# Patient Record
Sex: FEMALE | Race: WHITE | NOT HISPANIC OR LATINO | Employment: FULL TIME | URBAN - METROPOLITAN AREA
[De-identification: names, ages, dates, MRNs, and addresses within clinical notes are randomized per-mention and may not be internally consistent; named-entity substitution may affect disease eponyms.]

---

## 2021-06-14 ENCOUNTER — ANNUAL EXAM (OUTPATIENT)
Dept: OBGYN CLINIC | Facility: CLINIC | Age: 31
End: 2021-06-14
Payer: COMMERCIAL

## 2021-06-14 VITALS
DIASTOLIC BLOOD PRESSURE: 60 MMHG | SYSTOLIC BLOOD PRESSURE: 102 MMHG | BODY MASS INDEX: 25.07 KG/M2 | HEIGHT: 61 IN | WEIGHT: 132.8 LBS

## 2021-06-14 DIAGNOSIS — Z01.419 WOMEN'S ANNUAL ROUTINE GYNECOLOGICAL EXAMINATION: Primary | ICD-10-CM

## 2021-06-14 PROCEDURE — 99395 PREV VISIT EST AGE 18-39: CPT | Performed by: STUDENT IN AN ORGANIZED HEALTH CARE EDUCATION/TRAINING PROGRAM

## 2021-06-14 NOTE — PROGRESS NOTES
[id number]   [address] [facility] [address], [place], [place]    ASSESSMENT/PLAN: Germain Gauthier is a 28 y o  Lucero Douse who presents for annual gynecologic exam     Encounter for routine gynecologic examination  - Routine well woman exam completed today  - Cervical Cancer Screening: Current ASCCP Guidelines reviewed  Last Pap: 02/04/2020  History of abnormal: None  - HPV Vaccination status: Immunization series complete  - STI screening offered including HIV testing: offered, pt declined  - Contraceptive counseling discussed  Current contraception: IUD, satisfied:   - The following were reviewed in today's visit: breast self exam, STD testing, family planning choices, exercise and healthy diet    Additional problems addressed during this visit:  1  Women's annual routine gynecological examination        CC:  Annual Gynecologic Examination    HPI: Germain Gauthier is a 28 y o  Lucero Douse who presents for annual gynecologic examination  ROS: Negative except as noted in HPI    No LMP recorded  (Menstrual status: Birth Control)  Menstrual History  Period Pattern: (!) Irregular (In setting of Liletta IUD)  Menstrual Flow: Light  Dysmenorrhea: None    She  reports being sexually active and has had partner(s) who are Male   She reports using the following method of birth control/protection: I U D   Sexual History  Sexual Assault: No  Number of Partners in Last Year: 1 years  Sexually Transmitted Infection History: None  Multiple Sex Partners: No  Is Patient in a Monogamous Relationship?: Yes    The following portions of the patient's history were reviewed and updated as appropriate:   Past Medical History:   Diagnosis Date    Anxiety     Depression      Past Surgical History:   Procedure Laterality Date    WISDOM TOOTH EXTRACTION       Family History   Problem Relation Age of Onset    Hypertension Mother     Breast cancer Neg Hx     Uterine cancer Neg Hx     Ovarian cancer Neg Hx     Colon cancer Neg Hx Social History     Socioeconomic History    Marital status: /Civil Union     Spouse name: None    Number of children: None    Years of education: None    Highest education level: Bachelor's degree (e g , BA, AB, BS)   Occupational History    Occupation:    Tobacco Use    Smoking status: Never Smoker    Smokeless tobacco: Never Used   Vaping Use    Vaping Use: Never used   Substance and Sexual Activity    Alcohol use: Yes     Comment: Socially on weekends (1-4/week)     Drug use: Never     Comment: No     Sexual activity: Yes     Partners: Male     Birth control/protection: I U D  Comment: No new partner in last year ; Radha Gonsalez IUD inserted 3/2/21    Other Topics Concern    None   Social History Narrative    History of drug/alcohol abuse: Denies    Exercise: 5-7 times a week      Social Determinants of Health     Financial Resource Strain:     Difficulty of Paying Living Expenses:    Food Insecurity:     Worried About Running Out of Food in the Last Year:     [address] in the Last Year:    Transportation Needs:     Lack of Transportation (Medical):      Lack of Transportation (Non-Medical):    Physical Activity:     Days of Exercise per Week:     Minutes of Exercise per Session:    Stress:     Feeling of Stress :    Social Connections:     Frequency of Communication with Friends and Family:     Frequency of Social Gatherings with Friends and Family:     Attends Congregation Services:     Active Member of Clubs or Organizations:     Attends Club or Organization Meetings:     Marital Status:    Intimate Partner Violence:     Fear of Current or Ex-Partner:     Emotionally Abused:     Physically Abused:     Sexually Abused:      Outpatient Medications Marked as Taking for the 6/14/21 encounter (Annual Exam) with Twan Keller MD   Medication    Levonorgestrel (Heath Walters) 19 5 MCG/DAY IUD IUD    Prenatal MV-Min-Fe Fum-FA-DHA (PRENATAL 1 PO)     No Known Allergies Objective:  /60   Ht 5' 1 25" (1 556 m)   Wt 60 2 kg (132 lb 12 8 oz)   Breastfeeding No   BMI 24 89 kg/m²        Chaperone present? Yes: Rosalie Garza MA  General Appearance: alert and oriented, in no acute distress  Neck/Thyroid: No thyromegaly, no thyroid nodules  Respiratory: Unlabored breathing  Cardiovascular: Regular rate, no peripheral edema  Abdomen: Soft, non-tender, non-distended, no masses, no rebound or guarding  Breast Exam: No dimpling, nipple retraction or discharge  No lumps or masses  No axillary or supraclavicular nodes  Pelvic:       External genitalia: Normal appearance, no abnormal pigmentation, no lesions or masses  Normal Bartholin's and [place]'s  Urinary system: Urethral meatus normal, bladder non-tender  Vaginal: normal mucosa without prolapse or lesions  Normal-appearing physiologic discharge  Cervix: Normal-appearing, well-epithelialized, no gross lesions or masses  No cervical motion tenderness  IUD strings visualized      Adnexa: No adnexal masses or tenderness noted  Uterus: Normal-sized, regular contour, midline, mobile, no uterine tenderness  Extremities: Normal range of motion  Warm, well-perfused, non-tender     Skin: normal, no rash or abnormalities  Neurologic: alert, oriented x3  Psychiatric: Appropriate affect, mood stable, cooperative with exam         Minerva Rodriguez MD  6/14/2021 9:27 AM

## 2021-09-21 ENCOUNTER — VBI (OUTPATIENT)
Dept: ADMINISTRATIVE | Facility: OTHER | Age: 31
End: 2021-09-21

## 2021-09-21 NOTE — TELEPHONE ENCOUNTER
Upon review of the In Basket request we were able to locate, review, and update the patient chart as requested for Pap Smear (HPV) aka Cervical Cancer Screening  Any additional questions or concerns should be emailed to the Practice Liaisons via [email or web address] email, please do not reply via In Basket      Thank you  Jana Eng

## 2022-10-31 ENCOUNTER — ANNUAL EXAM (OUTPATIENT)
Dept: OBGYN CLINIC | Facility: CLINIC | Age: 32
End: 2022-10-31

## 2022-10-31 VITALS
HEIGHT: 62 IN | WEIGHT: 135.6 LBS | SYSTOLIC BLOOD PRESSURE: 118 MMHG | BODY MASS INDEX: 24.95 KG/M2 | DIASTOLIC BLOOD PRESSURE: 78 MMHG

## 2022-10-31 DIAGNOSIS — Z30.432 ENCOUNTER FOR IUD REMOVAL: ICD-10-CM

## 2022-10-31 DIAGNOSIS — Z97.5 IUD (INTRAUTERINE DEVICE) IN PLACE: ICD-10-CM

## 2022-10-31 DIAGNOSIS — Z12.4 ENCOUNTER FOR PAPANICOLAOU SMEAR FOR CERVICAL CANCER SCREENING: ICD-10-CM

## 2022-10-31 DIAGNOSIS — Z01.419 ENCOUNTER FOR GYNECOLOGICAL EXAMINATION WITHOUT ABNORMAL FINDING: Primary | ICD-10-CM

## 2022-10-31 RX ORDER — IBUPROFEN 600 MG/1
TABLET ORAL 3 TIMES DAILY
COMMUNITY

## 2022-10-31 NOTE — PROGRESS NOTES
Assessment/Plan:  Pap every 3-5 years if normal, sexually transmitted infection testing as indicated, exercise most days of week, obtain appropriate diet and hydration, Calcium 1000mg + 600 vit D daily,  Annual mammogram starting at age 37, monthly breast self exam    IUD removed intact without difficulty On PNV        Diagnoses and all orders for this visit:    Encounter for gynecological examination without abnormal finding    IUD (intrauterine device) in place    Other orders  -     Acetaminophen 500 MG; Every 6 hours  -     ibuprofen (MOTRIN) 600 mg tablet; 3 (three) times a day          Subjective:      Patient ID: Lita Frye is a 33 y o  female  Here for annual gyn Last pap 2/4/2020 no h/o abn pap G31 3year old daughter  has IUD Eugune Heri inserted 3/2021 Gets monthly spotting No other sx Wants IUD  Removed ready to conceive       The following portions of the patient's history were reviewed and updated as appropriate: allergies, current medications, past family history, past medical history, past social history, past surgical history and problem list     Review of Systems   Constitutional: Negative for fatigue and unexpected weight change  Gastrointestinal: Negative for abdominal distention, abdominal pain, constipation and diarrhea  Genitourinary: Negative for difficulty urinating, dyspareunia, dysuria, frequency, genital sores, menstrual problem, pelvic pain, urgency, vaginal bleeding, vaginal discharge and vaginal pain  Neurological: Negative for headaches  Psychiatric/Behavioral: Negative  Negative for dysphoric mood  The patient is not nervous/anxious  Objective:      /78   Ht 5' 1 5" (1 562 m)   Wt 61 5 kg (135 lb 9 6 oz)   Breastfeeding No   BMI 25 21 kg/m²          Physical Exam  Vitals and nursing note reviewed  Constitutional:       General: She is not in acute distress  Appearance: Normal appearance  HENT:      Head: Normocephalic and atraumatic  Pulmonary:      Effort: Pulmonary effort is normal    Chest:   Breasts: Breasts are symmetrical       Right: Normal  No mass, nipple discharge, skin change, tenderness or axillary adenopathy  Left: Normal  No mass, nipple discharge, skin change, tenderness or axillary adenopathy  Abdominal:      General: There is no distension  Palpations: Abdomen is soft  Tenderness: There is no abdominal tenderness  There is no guarding or rebound  Genitourinary:     General: Normal vulva  Exam position: Lithotomy position  Labia:         Right: No rash, tenderness, lesion or injury  Left: No rash, tenderness, lesion or injury  Urethra: No prolapse, urethral pain, urethral swelling or urethral lesion  Vagina: Normal  No erythema or lesions  Cervix: No cervical motion tenderness, discharge, lesion or cervical bleeding  Uterus: Normal        Adnexa: Right adnexa normal and left adnexa normal         Right: No mass or tenderness  Left: No mass or tenderness  Rectum: No mass or external hemorrhoid  Comments: PAP from cervix IUD strings grasped with a ring forceps and removed intact withtout difficulty   Musculoskeletal:         General: Normal range of motion  Lymphadenopathy:      Upper Body:      Right upper body: No axillary adenopathy  Left upper body: No axillary adenopathy  Lower Body: No right inguinal adenopathy  No left inguinal adenopathy  Skin:     General: Skin is warm and dry  Neurological:      Mental Status: She is alert and oriented to person, place, and time  Psychiatric:         Mood and Affect: Mood normal          Behavior: Behavior normal          Thought Content:  Thought content normal          Judgment: Judgment normal

## 2022-10-31 NOTE — PATIENT INSTRUCTIONS
Pap every 3-5 years if normal, sexually transmitted infection testing as indicated, exercise most days of week, obtain appropriate diet and hydration, Calcium 1000mg + 600 vit D daily,  Annual mammogram starting at age 37, monthly breast self exam    IUD removed intact without difficulty On PNV

## 2022-11-04 LAB
CYTOLOGIST CVX/VAG CYTO: NORMAL
DX ICD CODE: NORMAL
HPV GENOTYPE REFLEX: NORMAL
HPV I/H RISK 4 DNA CVX QL PROBE+SIG AMP: NEGATIVE
OTHER STN SPEC: NORMAL
PATH REPORT.FINAL DX SPEC: NORMAL
SL AMB NOTE:: NORMAL
SL AMB SPECIMEN ADEQUACY: NORMAL
SL AMB TEST METHODOLOGY: NORMAL

## 2023-03-06 ENCOUNTER — TELEPHONE (OUTPATIENT)
Dept: OBGYN CLINIC | Facility: CLINIC | Age: 33
End: 2023-03-06

## 2023-03-06 DIAGNOSIS — O20.9 FIRST TRIMESTER BLEEDING: Primary | ICD-10-CM

## 2023-03-06 NOTE — TELEPHONE ENCOUNTER
Patience Hendrickson called reporting mid pelvic cramping starting last night  Dark red bleeding started this morning  Denies left or right pelvic pain, denies heavy bleeding  Confirmed O+, preferred lab is Fadia Willis typically baseline HCG followed by repeat in 48 hours to follow trend  Provided ectopic precautions  Will review with DR Richmond and call back with confirmation of plan  Reviewed with Dr Ilir Baez who is in agreement to plan  Patience Hendrickson notified HCG order generated for [facility] to be completed today  Advised if she does not hear from   SOG with result and recommendation by 2pm to call nurse line  Patient in agreement

## 2023-03-07 LAB — HCG INTACT+B SERPL-ACNC: 255 MIU/ML

## 2023-03-07 NOTE — TELEPHONE ENCOUNTER
Samara Rivera called for recommendation after reviewing on patient portal  Bleeding has stopped, continues with mid pelvic menstrual type cramping  Advised to repeat HCG  Tomorrow-48 hours from initial HCG  Continue to  Monitor and report any changes  Reviewed ectopic precautions  Patient in agreement

## 2023-03-08 ENCOUNTER — OFFICE VISIT (OUTPATIENT)
Dept: OBGYN CLINIC | Facility: CLINIC | Age: 33
End: 2023-03-08

## 2023-03-08 ENCOUNTER — TELEPHONE (OUTPATIENT)
Dept: OBGYN CLINIC | Facility: CLINIC | Age: 33
End: 2023-03-08

## 2023-03-08 VITALS
SYSTOLIC BLOOD PRESSURE: 116 MMHG | DIASTOLIC BLOOD PRESSURE: 70 MMHG | HEIGHT: 62 IN | WEIGHT: 135.2 LBS | BODY MASS INDEX: 24.88 KG/M2

## 2023-03-08 DIAGNOSIS — O36.80X0 PREGNANCY OF UNKNOWN ANATOMIC LOCATION: Primary | ICD-10-CM

## 2023-03-08 NOTE — ASSESSMENT & PLAN NOTE
- Clinical history, exam, and findings of ultrasound today are most consistent with spontaneous early pregnancy loss  However, we discussed that ectopic pregnancy and early viable pregnancy cannot be definitively ruled out  - Blood type historically O postive (records from [address] [place] [address] reviewed)  Rhogam therefore not indicated  - Serum beta-hCG value of 255 (3/6/2023) reviewed  Patient had repeat hCG drawn this morning, which remains pending    - Discussed importance of serial hCG trend until in non-pregnant range (<5) or until viable intrauterine pregnancy confirmed  - Bleeding and ectopic precautions reviewed  Instructed patient to call office/on-call doctor immediately and proceed to ER for evaluation if severe or sudden onset of abdominal pain or if bleeding is heavy enough to soak two pads per hour for two consecutive hours

## 2023-03-08 NOTE — PROGRESS NOTES
[address] [facility], [address], [facility], [address] [address]    Assessment/Plan:  1  Pregnancy of unknown anatomic location  Assessment & Plan:  - Clinical history, exam, and findings of ultrasound today are most consistent with spontaneous early pregnancy loss  However, we discussed that ectopic pregnancy and early viable pregnancy cannot be definitively ruled out  - Blood type historically O postive (records from [address] [place] [address] reviewed)  Rhogam therefore not indicated  - Serum beta-hCG value of 255 (3/6/2023) reviewed  Patient had repeat hCG drawn this morning, which remains pending    - Discussed importance of serial hCG trend until in non-pregnant range (<5) or until viable intrauterine pregnancy confirmed  - Bleeding and ectopic precautions reviewed  Instructed patient to call office/on-call doctor immediately and proceed to ER for evaluation if severe or sudden onset of abdominal pain or if bleeding is heavy enough to soak two pads per hour for two consecutive hours  Subjective:   Alena Oleary is a 33 y o  Peace Libel presenting for evaluation of left pelvic cramping and vaginal bleeding in the setting of positive home pregnancy test      CC:   Chief Complaint   Patient presents with   • Pregnancy Ultrasound     Pt states she's having some pelvic pain more towards the left, also bleeding since Monday with cramping  HPI: Patient is currently 6w1d gestational age by LMP of 1/29/2023  She reports onset of light bleeding on Monday  Called office and was instructed to have hCG drawn, which she did on 3/6  Since then, bleeding has increased  Less than a normal period, but now dark red and with small clot  Today, she had onset of left-sided cramping, which prompted her to call  She reports pain is mild, but unilateral nature was of concern  ROS: Negative except as noted in HPI    Patient's last menstrual period was 01/29/2023 (exact date)         She  reports being sexually active and has had partner(s) who are male  She reports using the following method of birth control/protection: I U D          The following portions of the patient's history were reviewed and updated as appropriate:   Past Medical History:   Diagnosis Date   • Anxiety    • Depression      Past Surgical History:   Procedure Laterality Date   • WISDOM TOOTH EXTRACTION       Family History   Problem Relation Age of Onset   • Hypertension Mother    • Breast cancer Neg Hx    • Uterine cancer Neg Hx    • Ovarian cancer Neg Hx    • Colon cancer Neg Hx      Social History     Socioeconomic History   • Marital status: /Civil Union     Spouse name: None   • Number of children: None   • Years of education: None   • Highest education level: Bachelor's degree (e g , BA, AB, BS)   Occupational History   • Occupation:    Tobacco Use   • Smoking status: Never   • Smokeless tobacco: Never   Vaping Use   • Vaping Use: Never used   Substance and Sexual Activity   • Alcohol use: Yes     Comment: Socially on weekends (1-4/week)    • Drug use: Never     Comment: No    • Sexual activity: Yes     Partners: Male     Birth control/protection: I U D       Comment: No new partner in last year ; Cartagena Rule IUD inserted 3/2/21    Other Topics Concern   • None   Social History Narrative    History of drug/alcohol abuse: Denies    Exercise: 5-7 times a week      Social Determinants of Health     Financial Resource Strain: Not on file   Food Insecurity: Not on file   Transportation Needs: Not on file   Physical Activity: Not on file   Stress: Not on file   Social Connections: Not on file   Intimate Partner Violence: Not on file   Housing Stability: Not on file     Outpatient Medications Marked as Taking for the 3/8/23 encounter (Office Visit) with Rafy Pollock MD   Medication   • Prenatal MV-Min-Fe Fum-FA-DHA (PRENATAL 1 PO)     No Known Allergies      Imaging:   FIRST TRIMESTER OBSTETRIC ULTRASOUND  Date of study: 3/8/2023  Performed by: Milo Chapin MD     INDICATION: Vaginal bleeding in early pregnancy, Pregnancy of unknown anatomic location    COMPARISON: None  TECHNIQUE:   Transvaginal imaging was performed to assess the gestation, myometrial/endometrial architecture and ovarian parenchymal detail  The study includes volumetric sweeps and traditional still imaging technique  FINDINGS:     UTERUS:  No intrauterine gestation is identified  There is no adnexal mass or evidence of ectopic pregnancy  There is fluid within the endometrial cavity with regions of echogenic debris suggestive of blood products  The endometrial complex measures 9 5 mm in maximal thickness  There is no yolk sac or fetal pole definitively identified  ADNEXA:   The left ovary measures 2 3 x 2 7 x 2 8 cm and appears normal  The right ovary measures 4 0 x 1 7 x 2 2 cm and contains a corpus luteum cyst    No adnexal mass or pathologic cyst   Small amount of simple free fluid is identified  IMPRESSION:    Patient's last menstrual period was 01/29/2023 (exact date)  Findings represent pregnancy of unknown anatomic location  There is no overt evidence of ectopic pregnancy  While findings are suggestive of early pregnancy loss, an ectopic pregnancy or early viable pregnancy cannot be definitively ruled out  No adnexal masses seen  Milo Chapin MD  3/8/2023 3:12 PM    Objective:  /70 (BP Location: Right arm, Patient Position: Sitting, Cuff Size: Standard)   Ht 5' 1 5" (1 562 m)   Wt 61 3 kg (135 lb 3 2 oz)   LMP 01/29/2023 (Exact Date)   Breastfeeding No   BMI 25 13 kg/m²        Chaperone present? Yes: Varsha Garzon MA  General Appearance: alert and oriented, in no acute distress  Abdomen: Soft, non-tender, non-distended, no masses, no rebound or guarding  Pelvic:       External genitalia: Normal appearance, no abnormal pigmentation, no lesions or masses  Normal Bartholin's and [place]'s        Urinary system: Urethral meatus normal, bladder non-tender  Vaginal: normal mucosa without prolapse or lesions  Normal-appearing physiologic discharge  Small amount of dark blood in vault  Cervix: Fingertip dilation noted on digital exam  Normal-appearing, well-epithelialized, no gross lesions or masses  No cervical motion tenderness  Adnexa: No adnexal masses or tenderness noted  Uterus: Normal-sized, regular contour, midline, mobile, no uterine tenderness  Extremities: Normal range of motion     Skin: normal, no rash or abnormalities  Neurologic: alert, oriented x3  Psychiatric: Appropriate affect, mood stable, cooperative with exam         Jana Corcoran MD  3/8/2023 2:42 PM

## 2023-03-08 NOTE — TELEPHONE ENCOUNTER
Jayde Dos Santos called reporting increased red/brown vaginal bleeding with clots  Left side pelvic pain hip bone area started this morning  5w3d per LMP of 1/29/2023  Gwenette Carrier HCG 3/6/2023 255, due for repeat today  Offered appt per protocal for  11:40am today  Patient in agreement

## 2023-03-09 LAB — HCG INTACT+B SERPL-ACNC: 227 MIU/ML

## 2023-03-10 ENCOUNTER — TELEPHONE (OUTPATIENT)
Dept: OBGYN CLINIC | Facility: CLINIC | Age: 33
End: 2023-03-10

## 2023-03-10 DIAGNOSIS — O36.80X0 PREGNANCY OF UNKNOWN ANATOMIC LOCATION: Primary | ICD-10-CM

## 2023-03-10 NOTE — TELEPHONE ENCOUNTER
Called patient to review results of serial hCG, which showed decline from 255 to 227 over 48 hours  Clinical suspicion is for early SAB, although ectopic cannot be definitively ruled out  Patient reports that cramping has improved  She has had continued bleeding with a small amount of clot, but this is now improving  Recommend repeat hCG today  Order placed  Ectopic and bleeding precautions reviewed  Patient expressed understanding and agrees to plan       Leesa Ewing MD  3/10/2023 8:02 AM

## 2023-03-11 DIAGNOSIS — O36.80X0 PREGNANCY OF UNKNOWN ANATOMIC LOCATION: Primary | ICD-10-CM

## 2023-03-11 LAB — HCG INTACT+B SERPL-ACNC: 80 MIU/ML

## 2023-03-14 ENCOUNTER — TELEPHONE (OUTPATIENT)
Dept: OBGYN CLINIC | Facility: CLINIC | Age: 33
End: 2023-03-14

## 2023-03-14 DIAGNOSIS — O20.9 FIRST TRIMESTER BLEEDING: Primary | ICD-10-CM

## 2023-03-14 NOTE — TELEPHONE ENCOUNTER
Repeat hCG result not received  Please reach out to patient and have her complete labs as soon as possible       Phoenix Hopkins MD  3/14/2023 12:01 PM

## 2023-03-14 NOTE — TELEPHONE ENCOUNTER
Called and spoke with patient  She states she apologized that she was unable to get it done yesterday  She will try to get it done today if unable today then will get it done tomorrow

## 2023-03-14 NOTE — TELEPHONE ENCOUNTER
Kelby Salinas called from [address] [place] [address], they are unable to find her HCG order in system  (pending order is for epic lab) Needs order faxed to [phone]  New order generated, faxed to [facility], confirmation received

## 2023-03-15 DIAGNOSIS — O36.80X0 PREGNANCY OF UNKNOWN ANATOMIC LOCATION: Primary | ICD-10-CM

## 2023-03-15 LAB — HCG INTACT+B SERPL-ACNC: 14 MIU/ML

## 2023-03-22 LAB — HCG INTACT+B SERPL-ACNC: 2 MIU/ML

## 2023-06-05 ENCOUNTER — PATIENT MESSAGE (OUTPATIENT)
Dept: OBGYN CLINIC | Facility: CLINIC | Age: 33
End: 2023-06-05

## 2023-06-05 NOTE — PATIENT COMMUNICATION
1420: Spoke with patient and informed her that it is not uncommon to have spotting in between periods and to not anticipate any problems  Advised patient to continue tracking her ovulation times and periods  Also informed patient that if unable to conceive after one year, to give office a call to schedule an appointment for consultation for conception

## 2023-07-05 ENCOUNTER — TELEPHONE (OUTPATIENT)
Dept: OBGYN CLINIC | Facility: CLINIC | Age: 33
End: 2023-07-05

## 2023-07-05 NOTE — TELEPHONE ENCOUNTER
"7/5/23-Pt called to CX 1stPN appt due to miscarriage that started this morning. Symptoms are bleeding and cramping, did a HPT and said \"it's going down.\" Stated this is her 2nd miscarriage, she is aware of what she's experiencing.  "

## 2023-07-05 NOTE — TELEPHONE ENCOUNTER
Based upon information provided it sounds like a chemical pregnancy. Could repeat HPT in a few days then that would confirm.  If it is still positive then would need serum HCG and appointment

## 2023-09-06 LAB
HBV SURFACE AG SER QL: NONREACTIVE
HIV 1+2 AB+HIV1 P24 AG SERPL QL IA: NONREACTIVE
RPR SER QL: NORMAL
T PALLIDUM AB SER QL IF: NONREACTIVE

## 2024-03-14 LAB — GP B STREP SPEC QL CULT: ABNORMAL

## 2024-04-15 ENCOUNTER — APPOINTMENT (OUTPATIENT)
Dept: OBSTETRICS AND GYNECOLOGY | Facility: HOSPITAL | Age: 34
End: 2024-04-15
Payer: COMMERCIAL

## 2024-04-15 ENCOUNTER — HOSPITAL ENCOUNTER (INPATIENT)
Facility: HOSPITAL | Age: 34
LOS: 1 days | Discharge: HOME | End: 2024-04-16
Attending: OBSTETRICS & GYNECOLOGY | Admitting: OBSTETRICS & GYNECOLOGY
Payer: COMMERCIAL

## 2024-04-15 ENCOUNTER — ANESTHESIA (INPATIENT)
Dept: OBSTETRICS AND GYNECOLOGY | Facility: HOSPITAL | Age: 34
End: 2024-04-15
Payer: COMMERCIAL

## 2024-04-15 ENCOUNTER — ANESTHESIA EVENT (INPATIENT)
Dept: OBSTETRICS AND GYNECOLOGY | Facility: HOSPITAL | Age: 34
End: 2024-04-15
Payer: COMMERCIAL

## 2024-04-15 PROBLEM — O48.0 POST-DATES PREGNANCY: Status: ACTIVE | Noted: 2024-04-15

## 2024-04-15 PROBLEM — E66.9 OBESITY: Status: ACTIVE | Noted: 2024-04-15

## 2024-04-15 LAB
ABO + RH BLD: NORMAL
ABO + RH BLD: NORMAL
BLD GP AB SCN SERPL QL: NEGATIVE
D AG BLD QL: POSITIVE
D AG BLD QL: POSITIVE
ERYTHROCYTE [DISTWIDTH] IN BLOOD BY AUTOMATED COUNT: 13.2 % (ref 11.7–14.4)
HBV SURFACE AG SER QL: NONREACTIVE
HCT VFR BLD AUTO: 36.4 % (ref 35–45)
HGB BLD-MCNC: 12.1 G/DL (ref 11.8–15.7)
LABORATORY COMMENT REPORT: NORMAL
LABORATORY COMMENT REPORT: NORMAL
MCH RBC QN AUTO: 29.6 PG (ref 28–33.2)
MCHC RBC AUTO-ENTMCNC: 33.2 G/DL (ref 32.2–35.5)
MCV RBC AUTO: 89 FL (ref 83–98)
PDW BLD AUTO: 11.9 FL (ref 9.4–12.3)
PLATELET # BLD AUTO: 173 K/UL (ref 150–369)
RBC # BLD AUTO: 4.09 M/UL (ref 3.93–5.22)
SPECIMEN EXP DATE BLD: NORMAL
T PALLIDUM AB SER QL IF: NONREACTIVE
WBC # BLD AUTO: 10.27 K/UL (ref 3.8–10.5)

## 2024-04-15 PROCEDURE — 86850 RBC ANTIBODY SCREEN: CPT

## 2024-04-15 PROCEDURE — 25000000 HC PHARMACY GENERAL: Performed by: ANESTHESIOLOGY

## 2024-04-15 PROCEDURE — 36415 COLL VENOUS BLD VENIPUNCTURE: CPT | Performed by: OBSTETRICS & GYNECOLOGY

## 2024-04-15 PROCEDURE — 25800000 HC PHARMACY IV SOLUTIONS: Performed by: OBSTETRICS & GYNECOLOGY

## 2024-04-15 PROCEDURE — 37000005 HC ANESTHESIA EPIDURAL/SPINAL: Performed by: ANESTHESIOLOGY

## 2024-04-15 PROCEDURE — 72000011 HC VAGINAL DELIVERY LEVEL 1

## 2024-04-15 PROCEDURE — 3E033VJ INTRODUCTION OF OTHER HORMONE INTO PERIPHERAL VEIN, PERCUTANEOUS APPROACH: ICD-10-PCS | Performed by: OBSTETRICS & GYNECOLOGY

## 2024-04-15 PROCEDURE — 85027 COMPLETE CBC AUTOMATED: CPT | Performed by: OBSTETRICS & GYNECOLOGY

## 2024-04-15 PROCEDURE — 63700000 HC SELF-ADMINISTRABLE DRUG: Performed by: OBSTETRICS & GYNECOLOGY

## 2024-04-15 PROCEDURE — 12000000 HC ROOM AND CARE MED/SURG

## 2024-04-15 PROCEDURE — 25000000 HC PHARMACY GENERAL: Performed by: OBSTETRICS & GYNECOLOGY

## 2024-04-15 PROCEDURE — 63600000 HC DRUGS/DETAIL CODE: Mod: JZ | Performed by: OBSTETRICS & GYNECOLOGY

## 2024-04-15 PROCEDURE — 0KQM0ZZ REPAIR PERINEUM MUSCLE, OPEN APPROACH: ICD-10-PCS | Performed by: OBSTETRICS & GYNECOLOGY

## 2024-04-15 PROCEDURE — 87340 HEPATITIS B SURFACE AG IA: CPT | Performed by: OBSTETRICS & GYNECOLOGY

## 2024-04-15 PROCEDURE — 10907ZC DRAINAGE OF AMNIOTIC FLUID, THERAPEUTIC FROM PRODUCTS OF CONCEPTION, VIA NATURAL OR ARTIFICIAL OPENING: ICD-10-PCS | Performed by: OBSTETRICS & GYNECOLOGY

## 2024-04-15 PROCEDURE — 27200130 HC EPIDURAL ANES TRAY

## 2024-04-15 PROCEDURE — 86780 TREPONEMA PALLIDUM: CPT | Performed by: OBSTETRICS & GYNECOLOGY

## 2024-04-15 RX ORDER — CARBOPROST TROMETHAMINE 250 UG/ML
250 INJECTION, SOLUTION INTRAMUSCULAR ONCE AS NEEDED
Status: CANCELLED | OUTPATIENT
Start: 2024-04-15

## 2024-04-15 RX ORDER — MISOPROSTOL 200 UG/1
1000 TABLET ORAL ONCE AS NEEDED
Status: CANCELLED | OUTPATIENT
Start: 2024-04-15

## 2024-04-15 RX ORDER — EPHEDRINE SULFATE/0.9% NACL/PF 50 MG/5 ML
10 SYRINGE (ML) INTRAVENOUS EVERY 10 MIN PRN
Status: DISCONTINUED | OUTPATIENT
Start: 2024-04-15 | End: 2024-04-15

## 2024-04-15 RX ORDER — DIBUCAINE 1 %
1 OINTMENT (GRAM) TOPICAL AS NEEDED
Status: DISCONTINUED | OUTPATIENT
Start: 2024-04-15 | End: 2024-04-16 | Stop reason: HOSPADM

## 2024-04-15 RX ORDER — OXYTOCIN/0.9 % SODIUM CHLORIDE 30/500 ML
0-20 PLASTIC BAG, INJECTION (ML) INTRAVENOUS CONTINUOUS
Status: DISCONTINUED | OUTPATIENT
Start: 2024-04-15 | End: 2024-04-15

## 2024-04-15 RX ORDER — ALUMINUM HYDROXIDE, MAGNESIUM HYDROXIDE, AND SIMETHICONE 1200; 120; 1200 MG/30ML; MG/30ML; MG/30ML
30 SUSPENSION ORAL EVERY 4 HOURS PRN
Status: DISCONTINUED | OUTPATIENT
Start: 2024-04-15 | End: 2024-04-16 | Stop reason: HOSPADM

## 2024-04-15 RX ORDER — LIDOCAINE HYDROCHLORIDE AND EPINEPHRINE 15; 5 MG/ML; UG/ML
INJECTION, SOLUTION EPIDURAL
Status: COMPLETED | OUTPATIENT
Start: 2024-04-15 | End: 2024-04-15

## 2024-04-15 RX ORDER — OXYTOCIN 10 [USP'U]/ML
10 INJECTION, SOLUTION INTRAMUSCULAR; INTRAVENOUS ONCE AS NEEDED
Status: CANCELLED | OUTPATIENT
Start: 2024-04-15

## 2024-04-15 RX ORDER — IBUPROFEN 600 MG/1
600 TABLET ORAL EVERY 6 HOURS
Status: DISCONTINUED | OUTPATIENT
Start: 2024-04-15 | End: 2024-04-16 | Stop reason: HOSPADM

## 2024-04-15 RX ORDER — CALCIUM CARBONATE 200(500)MG
500 TABLET,CHEWABLE ORAL EVERY 4 HOURS PRN
Status: DISCONTINUED | OUTPATIENT
Start: 2024-04-15 | End: 2024-04-16 | Stop reason: HOSPADM

## 2024-04-15 RX ORDER — SODIUM CHLORIDE, SODIUM LACTATE, POTASSIUM CHLORIDE, CALCIUM CHLORIDE 600; 310; 30; 20 MG/100ML; MG/100ML; MG/100ML; MG/100ML
125 INJECTION, SOLUTION INTRAVENOUS CONTINUOUS
Status: DISCONTINUED | OUTPATIENT
Start: 2024-04-15 | End: 2024-04-15

## 2024-04-15 RX ORDER — DIPHENHYDRAMINE HCL 25 MG
25 CAPSULE ORAL EVERY 6 HOURS PRN
Status: DISCONTINUED | OUTPATIENT
Start: 2024-04-15 | End: 2024-04-16 | Stop reason: HOSPADM

## 2024-04-15 RX ORDER — ACETAMINOPHEN 325 MG/1
650 TABLET ORAL EVERY 4 HOURS PRN
Status: DISCONTINUED | OUTPATIENT
Start: 2024-04-15 | End: 2024-04-16 | Stop reason: HOSPADM

## 2024-04-15 RX ORDER — METHYLERGONOVINE MALEATE 0.2 MG/ML
200 INJECTION INTRAVENOUS ONCE AS NEEDED
Status: CANCELLED | OUTPATIENT
Start: 2024-04-15

## 2024-04-15 RX ORDER — IBUPROFEN 600 MG/1
600 TABLET ORAL EVERY 6 HOURS PRN
Status: DISCONTINUED | OUTPATIENT
Start: 2024-04-15 | End: 2024-04-15

## 2024-04-15 RX ORDER — TRANEXAMIC ACID 10 MG/ML
1000 INJECTION, SOLUTION INTRAVENOUS ONCE AS NEEDED
Status: CANCELLED | OUTPATIENT
Start: 2024-04-15

## 2024-04-15 RX ORDER — OXYTOCIN/0.9 % SODIUM CHLORIDE 40/1000ML
500 PLASTIC BAG, INJECTION (ML) INTRAVENOUS ONCE
Status: COMPLETED | OUTPATIENT
Start: 2024-04-15 | End: 2024-04-15

## 2024-04-15 RX ORDER — OXYTOCIN/0.9 % SODIUM CHLORIDE 40/1000ML
PLASTIC BAG, INJECTION (ML) INTRAVENOUS CONTINUOUS
Status: DISCONTINUED | OUTPATIENT
Start: 2024-04-15 | End: 2024-04-15

## 2024-04-15 RX ORDER — AMOXICILLIN 250 MG
1 CAPSULE ORAL 2 TIMES DAILY
Status: DISCONTINUED | OUTPATIENT
Start: 2024-04-15 | End: 2024-04-16 | Stop reason: HOSPADM

## 2024-04-15 RX ORDER — LIDOCAINE HYDROCHLORIDE 10 MG/ML
0-30 INJECTION, SOLUTION EPIDURAL; INFILTRATION; INTRACAUDAL; PERINEURAL ONCE AS NEEDED
Status: CANCELLED | OUTPATIENT
Start: 2024-04-15

## 2024-04-15 RX ORDER — SODIUM CHLORIDE, SODIUM LACTATE, POTASSIUM CHLORIDE, CALCIUM CHLORIDE 600; 310; 30; 20 MG/100ML; MG/100ML; MG/100ML; MG/100ML
INJECTION, SOLUTION INTRAVENOUS CONTINUOUS
Status: DISCONTINUED | OUTPATIENT
Start: 2024-04-15 | End: 2024-04-15

## 2024-04-15 RX ORDER — ONDANSETRON 4 MG/1
4 TABLET, ORALLY DISINTEGRATING ORAL EVERY 6 HOURS PRN
Status: DISCONTINUED | OUTPATIENT
Start: 2024-04-15 | End: 2024-04-16 | Stop reason: HOSPADM

## 2024-04-15 RX ORDER — FENTANYL/ROPIVACAINE/NS/PF 2-1500 MCG
PREFILLED PUMP RESERVOIR INJECTION CONTINUOUS
Status: DISCONTINUED | OUTPATIENT
Start: 2024-04-15 | End: 2024-04-15

## 2024-04-15 RX ADMIN — OXYTOCIN 20 UNITS: 10 INJECTION INTRAVENOUS at 15:46

## 2024-04-15 RX ADMIN — LIDOCAINE HYDROCHLORIDE,EPINEPHRINE BITARTRATE 5 ML: 15; .005 INJECTION, SOLUTION EPIDURAL; INFILTRATION; INTRACAUDAL; PERINEURAL at 12:45

## 2024-04-15 RX ADMIN — WATER 2.5 MILLION UNITS: 1 INJECTION INTRAMUSCULAR; INTRAVENOUS; SUBCUTANEOUS at 13:52

## 2024-04-15 RX ADMIN — Medication 50 ML: at 12:15

## 2024-04-15 RX ADMIN — Medication 2 MILLI-UNITS/MIN: at 09:50

## 2024-04-15 RX ADMIN — DOCUSATE SODIUM AND SENNOSIDES 1 TABLET: 8.6; 5 TABLET, FILM COATED ORAL at 21:13

## 2024-04-15 RX ADMIN — IBUPROFEN 600 MG: 600 TABLET, FILM COATED ORAL at 21:15

## 2024-04-15 RX ADMIN — SODIUM CHLORIDE, POTASSIUM CHLORIDE, SODIUM LACTATE AND CALCIUM CHLORIDE 1000 ML: 600; 310; 30; 20 INJECTION, SOLUTION INTRAVENOUS at 09:33

## 2024-04-15 RX ADMIN — SODIUM CHLORIDE 5 MILLION UNITS: 9 INJECTION, SOLUTION INTRAVENOUS at 09:50

## 2024-04-15 NOTE — ANESTHESIA PREPROCEDURE EVALUATION
ROS/Med Hx    Relevant Problems   No relevant active problems       Physical Exam    Airway   Mallampati: II   TM distance: >3 FB    Anesthesia Plan    Plan: epidural    Technique: epidural      2 ASA  Anesthetic plan and risks discussed with: patient

## 2024-04-15 NOTE — ANESTHESIOLOGIST PRE-PROCEDURE ATTESTATION
Pre-Procedure Patient Identification:  I am the Primary Anesthesiologist and have identified the patient on 04/15/24 at 12:42 PM.   I have confirmed the procedure(s) will be performed by the following surgeon/proceduralist * No surgeons listed *.

## 2024-04-15 NOTE — H&P
Obstetrics H&P    HPI     Aide Aguilar is a 34 y.o. female  at 40w5d with an estimated due date of 4/10/2024, by Patient Reported who presents for induction of laor    OB PROBLEM LIST  GBS POS    OB History:   OB History    Para Term  AB Living   4 1 1 0 2 0   SAB IAB Ectopic Multiple Live Births   2 0 0 0 0      # Outcome Date GA Lbr Reuebn/2nd Weight Sex Delivery Anes PTL Lv   4 Current            3 SAB            2 SAB            1 Term                Medical History:   Past Medical History:   Diagnosis Date    Obesity        Surgical History:   Past Surgical History:   Procedure Laterality Date    WISDOM TOOTH EXTRACTION         Social History:   Social History     Socioeconomic History    Marital status:      Spouse name: Noah    Number of children: None    Years of education: None    Highest education level: None   Tobacco Use    Smoking status: Never    Smokeless tobacco: Never   Substance and Sexual Activity    Alcohol use: Not Currently    Drug use: Never    Sexual activity: Yes     Social Determinants of Health     Food Insecurity: No Food Insecurity (4/15/2024)    Hunger Vital Sign     Worried About Running Out of Food in the Last Year: Never true     Ran Out of Food in the Last Year: Never true        Family History:   Family History   Problem Relation Age of Onset    Hypothyroidism Biological Father     Hypertension Biological Mother        Allergies: Patient has no known allergies.    Prior to Admission medications    Medication Sig Start Date End Date Taking? Authorizing Provider   prenatal vit no.130-iron-folic 27 mg iron- 800 mcg tablet tablet Take 1 tablet by mouth daily.   Yes Provider, jai Historical, MD       Review of Systems  Pertinent items are noted in HPI.    Objective     Vital Signs for the last 24 hours:  Temp:  [36.8 °C (98.2 °F)] 36.8 °C (98.2 °F)  Heart Rate:  [88] 88  Resp:  [18] 18  BP: (121)/(78) 121/78    Latest cervical exam: 4 cm in office                        Fetal Monitoring:  FHR Baseline: 130  FHR Variability: moderate  FHR Accelerations: present  FHR Decelerations: absent    Contraction Frequency: irregular     Exam:  General Appearance: Alert, cooperative, no acute distress  Abdomen: gravid, nontender  Genitalia: See vaginal exam  Extremities: no edema or calf tenderness  Neurologic: grossly intact without focal deficits    Ultrasounds:   I have reviewed the applicable Ultrasounds.        Labs:  Strep Gp B Cult/DNA Probe   Date Value Ref Range Status   2024 **Positive Culture** (A) See table below, No growth at 18-24 hours, Probable contaminants, suggest recollection, No growth at 48 hours, No growth at 72 hours, No growth at 96 hours, No growth at 120 hours, No growth at 1 week, No growth at 2 weeks, No growth at 3 weeks, No gr... Final   O POS  HIV/HEPB/HEPC/RPR NR  Rubella and varicella immune  Assessment/Plan     Aide Aguilar is a 34 y.o. female  at 40w5d admitted for induction of labor     1) FHR: Category I  2) GBS: positive  3) PCN for GBS   4) pitocin Induction    Maureen Painting MD  4/15/2024  10:06 AM

## 2024-04-15 NOTE — PROGRESS NOTES
Labor and Delivery Progress Note    Subjective     Patient getting epidural     Objective     Vital Signs for the last 24 hours:  Temp:  [36.8 °C (98.2 °F)] 36.8 °C (98.2 °F)  Heart Rate:  [78-88] 85  Resp:  [18] 18  BP: (105-121)/(58-78) 121/71     Fetal Monitoring:  FHR Baseline: 130  FHR Variability: mod  FHR Accelerations: pos  FHR Decelerations: none     Contraction Frequency: 3  Pit @ 8     Latest cervical exam:                     Assessment/Plan     Aide Aguilar is a 34 y.o. female  at 40w5d admitted for induction of labor     1) FHR: Category I  2) GBS: positive  3) Labor:latent  4) Pain: epidural    Maureen Painting MD

## 2024-04-15 NOTE — PROGRESS NOTES
Labor and Delivery Progress Note    Subjective     Patient comfortable    Objective     Vital Signs for the last 24 hours:  Temp:  [36.8 °C (98.2 °F)] 36.8 °C (98.2 °F)  Heart Rate:  [] 98  Resp:  [18] 18  BP: (105-129)/(58-78) 123/68     Fetal Monitoring:  FHR Baseline: 130  FHR Variability: mod  FHR Accelerations: none  FHR Decelerations: none     Contraction Frequency: 2  Pit @ 2    Latest cervical exam:  Cervical Dilation (cm): 7  Cervical Effacement: 80  Fetal Station: 0  Method: sterile exam per physician (04/15/24 1346)    Vaginal Bleeding: bloody show (04/15/24 134)  AROM clear fluid   Assessment/Plan     Aide Aguilar is a 34 y.o. female  at 40w5d admitted for induction of labor     1) FHR: CAT 2 active labor  2) GBS: positive  3) Labor:active  4) Pain: epidural    Maureen Painting MD

## 2024-04-15 NOTE — L&D DELIVERY NOTE
OB Vaginal Delivery Note    Delivery:4/15/2024 at 3:42 PM   Patient:Aide Aguilar  :1990     Review the Delivery Report for details.     Delivery Details    Pre-Op Diagnosis: 1. 34 y.o.  intrauterine pregnancy at 40w5d with yap gestation.  2. Post dates indcution  3. GBS pos   Post-Op Diagnosis: 1. Vaginal delivery   Delivery Clinician: Maureen Painting    Delivery Assist;Delivery Nurse;Nursery Nurse;  Grace Martinez;[place];Alejandro Azul;Angela Leslie    Delivery Type: Vaginal, Spontaneous    Labor Complications:     EBL: 350  mL   Anesthesia Type: Epidural    Placenta Delivery Spontaneous    Placenta Disposition: discarded    Additional Specimens Cord Blood      INFANT INFORMATION  Time of Birth:3:42 PM   Presentation: Vertex   Position:Left ,Occiput ,Anterior   Cord: 3 vessels ,Complications:None   [place] Sex: male   [place] Weight:       1 Minute 5 Minute 10 Minute   Apgar Totals: 8   9          Information for the patient's :  Elmer Aguilar [[id number]]     [place] Cord Gas     None           Delivery Details:    Aide Aguilar is a 34 y.o.  at 40w5d gestation who presented to the hospital for Post-dates pregnancy [O48.0].  Her labor was induced  AROM and oxytocin.  The patient progressed to fully dilated and pushed for  hours and   minutes.  A viable  male  infant was delivered by Vaginal, Spontaneous  from Left ,Occiput ,Anterior .  The shoulders delivered without difficulty. The baby was vigorous and placed on the mom's chest. The cord was clamped x 2 and cut by dad.  The placenta delivered spontaneously, intact.  Fundal massage was performed and the fundus was found to be firm, and lochia was within normal limits. The perineum, vagina, cervix were inspected, and the following lacerations were noted:      Episiotomy: None    Lacerations:   Perineal: 2nd  Repaired: Yes     Periurethral:    Repaired:     Labial:   Repaired:     Sulcus:   Repaired:      Vaginal:   Repaired:     Cervical:    Repaired:        Any lacerations were repaired in the usual fashion using 2-0 Synthetic Suture  suture. Excellent hemostasis and cosmesis were noted. A vaginal sweep was performed and no retained sponges were palpated. At the completion of the case, sponge and needle counts were correct. The sponge count was verified as correct with the e-SENS sponge counter device. The infant and patient were left in the delivery room in stable condition.     Maureen Painting MD

## 2024-04-15 NOTE — ANESTHESIA PROCEDURE NOTES
Epidural Block    Patient location during procedure: OB  Start time: 4/15/2024 12:35 PM  End time: 4/15/2024 12:40 PM  Reason for block: labor analgesia requested by patient and obstetrician  Staffing  Performed: anesthesiologist   Anesthesiologist: Timothy Jain DO  Performed by: Timothy Jain DO  Authorized by: Timothy Jain DO    Preanesthetic Checklist  Completed: patient identified, surgical consent, pre-op evaluation, timeout performed, IV checked, risks and benefits discussed, monitors and equipment checked and sterile field maintained during procedure  Needle  Needle type: Tuohy   Needle gauge: 17 G  Needle length: 3.5 in  Catheter type: Single orifice  Catheter size: 19 G  Test dose: negative and lidocaine 1.5% with epinephrine 1-to-200,000  Additional Notes  Procedure well tolerated. Vital signs stable. No paresthesia.  Analgesia satisfactory. Patients nurse to notify anesthesiologist if hemodynamically unstable.    Medications Administered -   lidocaine 1.5%-EPINEPHrine 1:200,000 PF (XYLOCAINE W/EPI) injection - epidural   5 mL - 4/15/2024 12:45:00 PM

## 2024-04-16 VITALS
OXYGEN SATURATION: 97 % | DIASTOLIC BLOOD PRESSURE: 66 MMHG | WEIGHT: 165 LBS | RESPIRATION RATE: 16 BRPM | SYSTOLIC BLOOD PRESSURE: 121 MMHG | HEIGHT: 62 IN | BODY MASS INDEX: 30.36 KG/M2 | HEART RATE: 79 BPM | TEMPERATURE: 97.8 F

## 2024-04-16 PROCEDURE — 63700000 HC SELF-ADMINISTRABLE DRUG: Performed by: OBSTETRICS & GYNECOLOGY

## 2024-04-16 RX ORDER — ACETAMINOPHEN 325 MG/1
650 TABLET ORAL EVERY 6 HOURS PRN
Start: 2024-04-16 | End: 2024-04-23

## 2024-04-16 RX ORDER — IBUPROFEN 200 MG
600 TABLET ORAL EVERY 6 HOURS PRN
Start: 2024-04-16

## 2024-04-16 RX ADMIN — DOCUSATE SODIUM AND SENNOSIDES 1 TABLET: 8.6; 5 TABLET, FILM COATED ORAL at 09:13

## 2024-04-16 RX ADMIN — IBUPROFEN 600 MG: 600 TABLET, FILM COATED ORAL at 13:23

## 2024-04-16 RX ADMIN — PRENATAL VITAMINS-IRON FUMARATE 27 MG IRON-FOLIC ACID 0.8 MG TABLET 1 TABLET: at 09:13

## 2024-04-16 NOTE — ANESTHESIA POSTPROCEDURE EVALUATION
Patient: Aide Aguilar    Procedure Summary       Date: 04/15/24 Room / Location:     Anesthesia Start: 1235 Anesthesia Stop: 1542    Procedure: Labor Analgesia Diagnosis:     Scheduled Providers:  Responsible Provider: Timothy Jain DO    Anesthesia Type: epidural ASA Status: 2            Anesthesia Type: epidural  PACU Vitals    No data found in the last 10 encounters.           Anesthesia Post Evaluation

## 2024-04-16 NOTE — PROGRESS NOTES
Obstetrics Postpartum Progress Note    Events  No acute events overnight.    Subjective  Pain: no  Bleeding: lochia minimal  Diet: taking regular diet  Voiding: without difficulty  Bowel: passing flatus  Ambulating: as tolerated  Feeding: plans to breastfeed    Vitals  Temp:  [36.7 °C (98.1 °F)-37 °C (98.6 °F)] 36.7 °C (98.1 °F)  Heart Rate:  [] 85  Resp:  [16-18] 16  BP: (105-146)/(57-86) 131/76    I&O    Intake/Output Summary (Last 24 hours) at 2024 1118  Last data filed at 4/15/2024 1830  Gross per 24 hour   Intake --   Output 975 ml   Net -975 ml       Physical Exam  General: well  Abdomen: soft, nondistended, non-tender  Fundus: firm and below umbilicus  Incision: not applicable, (vaginal delivery)  Extremities: symmetric    Labs  Labs Reviewed:  Lab Results   Component Value Date    ABO O 04/15/2024    LABRH Positive 04/15/2024      Rubella: immune    Assessment/Plan     Problem-based Assessment and Plan    Aide Aguilar is a 34 y.o.  postpartum day 1 s/p Vaginal, Spontaneous .    1) Post-partum: appropriate post-partum course, continue routine care  2) Hemodynamics: stable  3) VTE: low risk, ambulation  4) Rh/immunization: not indicated  5) Desires early discharge home today, instructions reviewed  6) Circ consent signed    Kalie Kay MD

## 2024-04-16 NOTE — DISCHARGE SUMMARY
Inpatient Discharge Summary    BRIEF OVERVIEW  Discharge Provider: Klaie Kay MD  Primary care provider: No primary care provider on file.    Admission Date: 4/15/2024     Discharge Date: 2024    Discharge Diagnosis  Postpartum state    Discharge Disposition  Home    Discharge Medications     Medication List      START taking these medications    acetaminophen 325 mg tablet  Commonly known as: TYLENOL  Take 2 tablets (650 mg total) by mouth every 6 (six) hours as needed for pain or moderate pain for up to 7 days.  Dose: 650 mg     ibuprofen 200 mg tablet  Commonly known as: MOTRIN  Take 3 tablets (600 mg total) by mouth every 6 (six) hours as needed for moderate pain for up to 7 doses.  Dose: 600 mg        CONTINUE taking these medications    prenatal vit no.130-iron-folic 27 mg iron- 800 mcg tablet tablet  Take 1 tablet by mouth daily.  Dose: 1 tablet            Outpatient Follow-Up  Postpartum followup in 6 weeks        DETAILS OF HOSPITAL STAY      Aide Aguilar is a 34 y.o. female  admitted for Post-dates pregnancy [O48.0]. She had a vaginal delivery, please see delivery note for details. She had an uncomplicated post-partum course and was discharged on post-partum day #1 with instructions to follow-up in the office for a routine post-partum visit in 6 weeks.    Kalie Kay MD

## 2024-04-16 NOTE — PLAN OF CARE
Plan of Care Review  Plan of Care Reviewed With: patient  Outcome Evaluation: VSS. Bleeding WNL. Patient expresses readiness for DC to home. Pain under good control.

## 2024-04-16 NOTE — LACTATION NOTE
This note was copied from a baby's chart.  P2 Mom reports baby is bf well.Reviewed Bf Basics, output, positioning and feeding frequency.Bf book and Lc resources for home reviewed. Baby has been doing normal cluster feeding, explained rationale, reviewed whys and baby's output.Mom has a breast pump at home, pump tips given.